# Patient Record
Sex: MALE | Race: WHITE | NOT HISPANIC OR LATINO | Employment: OTHER | URBAN - METROPOLITAN AREA
[De-identification: names, ages, dates, MRNs, and addresses within clinical notes are randomized per-mention and may not be internally consistent; named-entity substitution may affect disease eponyms.]

---

## 2017-03-22 ENCOUNTER — PREPPED CHART (OUTPATIENT)
Dept: URBAN - METROPOLITAN AREA CLINIC 43 | Facility: CLINIC | Age: 82
End: 2017-03-22

## 2018-03-21 ENCOUNTER — ESTABLISHED COMPREHENSIVE EXAM (OUTPATIENT)
Dept: URBAN - METROPOLITAN AREA CLINIC 43 | Facility: CLINIC | Age: 83
End: 2018-03-21

## 2018-03-21 DIAGNOSIS — H35.3131: ICD-10-CM

## 2018-03-21 DIAGNOSIS — H25.813: ICD-10-CM

## 2018-03-21 DIAGNOSIS — H43.813: ICD-10-CM

## 2018-03-21 DIAGNOSIS — H35.373: ICD-10-CM

## 2018-03-21 PROCEDURE — 92014 COMPRE OPH EXAM EST PT 1/>: CPT

## 2018-03-21 PROCEDURE — 92015 DETERMINE REFRACTIVE STATE: CPT

## 2018-03-21 PROCEDURE — 4177F TALK PT/CRGVR RE AREDS PREV: CPT

## 2018-03-21 PROCEDURE — 1036F TOBACCO NON-USER: CPT

## 2018-03-21 PROCEDURE — G8427 DOCREV CUR MEDS BY ELIG CLIN: HCPCS

## 2018-03-21 PROCEDURE — 2019F DILATED MACUL EXAM DONE: CPT

## 2018-03-21 PROCEDURE — G8785 BP SCRN NO PERF AT INTERVAL: HCPCS

## 2018-03-21 ASSESSMENT — VISUAL ACUITY
OD_SC: 20/200
OS_SC: J10
OD_BAT: 20/400
OD_CC: J1+
OS_SC: 20/200
OS_CC: J1+
OD_SC: J10
OS_BAT: 20/400
OD_CC: 20/50-1
OS_CC: 20/40-2

## 2018-03-21 ASSESSMENT — TONOMETRY
OS_IOP_MMHG: 13
OD_IOP_MMHG: 13

## 2019-04-10 ENCOUNTER — ESTABLISHED COMPREHENSIVE EXAM (OUTPATIENT)
Dept: URBAN - METROPOLITAN AREA CLINIC 43 | Facility: CLINIC | Age: 84
End: 2019-04-10

## 2019-04-10 DIAGNOSIS — H43.813: ICD-10-CM

## 2019-04-10 DIAGNOSIS — H35.3131: ICD-10-CM

## 2019-04-10 DIAGNOSIS — H25.813: ICD-10-CM

## 2019-04-10 DIAGNOSIS — H35.373: ICD-10-CM

## 2019-04-10 PROCEDURE — 92134 CPTRZ OPH DX IMG PST SGM RTA: CPT

## 2019-04-10 PROCEDURE — 92014 COMPRE OPH EXAM EST PT 1/>: CPT

## 2019-04-10 PROCEDURE — 92015 DETERMINE REFRACTIVE STATE: CPT

## 2019-04-10 ASSESSMENT — VISUAL ACUITY
OS_CC: J1
OS_SC: J12-
OD_CC: 20/40-2
OD_BAT: <20/400
OD_SC: J12
OD_SC: 20/400
OD_CC: J1
OD_PH: 20/50-1
OS_SC: 20/400
OS_BAT: <20/400
OS_PH: 20/60-1
OS_CC: 20/40-2

## 2019-04-10 ASSESSMENT — TONOMETRY
OS_IOP_MMHG: 11
OD_IOP_MMHG: 11

## 2019-05-14 ENCOUNTER — CATARACT CONSULT (OUTPATIENT)
Dept: URBAN - METROPOLITAN AREA CLINIC 43 | Facility: CLINIC | Age: 84
End: 2019-05-14

## 2019-05-14 DIAGNOSIS — H25.811: ICD-10-CM

## 2019-05-14 DIAGNOSIS — H25.812: ICD-10-CM

## 2019-05-14 DIAGNOSIS — H18.51: ICD-10-CM

## 2019-05-14 DIAGNOSIS — H35.373: ICD-10-CM

## 2019-05-14 DIAGNOSIS — H18.59: ICD-10-CM

## 2019-05-14 DIAGNOSIS — H35.3131: ICD-10-CM

## 2019-05-14 PROCEDURE — 99204 OFFICE O/P NEW MOD 45 MIN: CPT

## 2019-05-14 PROCEDURE — 92136TC INTERFEROMETRY - TECHNICAL COMPONENT

## 2019-05-14 PROCEDURE — 92286 ANT SGM IMG I&R SPECLR MIC: CPT

## 2019-05-14 PROCEDURE — 92025-2 CORNEAL TOPOGRAPHY, PT

## 2019-05-14 PROCEDURE — V2799I IMPRIMIS PREDGATIBROM

## 2019-05-14 PROCEDURE — 92134 CPTRZ OPH DX IMG PST SGM RTA: CPT

## 2019-05-14 ASSESSMENT — VISUAL ACUITY
OS_AM: 20/20
OD_CC: J1
OS_CC: J1
OS_SC: J12
OS_PH: 20/40+1
OD_RAM: 20/20
OS_BAT: <20/400
OD_SC: <J12
OD_SC: 20/200
OS_SC: 20/100
OD_CC: 20/50
OD_PH: 20/40
OD_BAT: <20/400
OS_CC: 20/40-2

## 2019-05-14 ASSESSMENT — TONOMETRY
OD_IOP_MMHG: 12
OS_IOP_MMHG: 12

## 2019-06-04 ENCOUNTER — TECH ONLY (OUTPATIENT)
Dept: URBAN - METROPOLITAN AREA CLINIC 43 | Facility: CLINIC | Age: 84
End: 2019-06-04

## 2019-06-04 DIAGNOSIS — H25.812: ICD-10-CM

## 2019-06-04 DIAGNOSIS — H25.811: ICD-10-CM

## 2019-06-04 PROCEDURE — 92025NC COMP. CORNEAL TOPO, UNI OR BILAT,

## 2019-06-04 PROCEDURE — 92136TCNC INTERFEROMETRY -TECHNICAL COMPONENT NO CHARGE

## 2019-06-10 ENCOUNTER — PRE-OP/H&P (OUTPATIENT)
Dept: URBAN - METROPOLITAN AREA CLINIC 39 | Facility: CLINIC | Age: 84
End: 2019-06-10

## 2019-06-10 ENCOUNTER — SURGERY/PROCEDURE (OUTPATIENT)
Dept: URBAN - METROPOLITAN AREA CLINIC 43 | Facility: CLINIC | Age: 84
End: 2019-06-10

## 2019-06-10 DIAGNOSIS — H35.3131: ICD-10-CM

## 2019-06-10 DIAGNOSIS — H25.812: ICD-10-CM

## 2019-06-10 DIAGNOSIS — H25.811: ICD-10-CM

## 2019-06-10 DIAGNOSIS — H18.59: ICD-10-CM

## 2019-06-10 DIAGNOSIS — H18.51: ICD-10-CM

## 2019-06-10 DIAGNOSIS — H35.373: ICD-10-CM

## 2019-06-10 PROCEDURE — 99211T TECH SERVICE

## 2019-06-10 PROCEDURE — 65772LRI LRI DURING CAT SX

## 2019-06-10 PROCEDURE — 66984AV REMOVE CATARACT, INSERT ADVANCED LENS

## 2019-06-10 PROCEDURE — 66999LNSR LENSAR LASER FOR CAT SX

## 2019-06-11 ENCOUNTER — POST OP/EVAL OF SECOND EYE (OUTPATIENT)
Dept: URBAN - METROPOLITAN AREA CLINIC 43 | Facility: CLINIC | Age: 84
End: 2019-06-11

## 2019-06-11 DIAGNOSIS — H25.812: ICD-10-CM

## 2019-06-11 DIAGNOSIS — Z96.1: ICD-10-CM

## 2019-06-11 PROCEDURE — 99024 POSTOP FOLLOW-UP VISIT: CPT

## 2019-06-11 PROCEDURE — 92012 INTRM OPH EXAM EST PATIENT: CPT

## 2019-06-11 ASSESSMENT — VISUAL ACUITY
OS_BAT: <20/400
OD_SC: 20/60-1
OS_SC: J12
OS_CC: 20/40-2
OS_PH: 20/40+1
OD_SC: J12
OS_SC: 20/100
OS_CC: J1

## 2019-06-11 ASSESSMENT — TONOMETRY
OS_IOP_MMHG: 12
OD_IOP_MMHG: 11

## 2019-06-17 ENCOUNTER — SURGERY/PROCEDURE (OUTPATIENT)
Dept: URBAN - METROPOLITAN AREA CLINIC 43 | Facility: CLINIC | Age: 84
End: 2019-06-17

## 2019-06-17 ENCOUNTER — PRE-OP/H&P (OUTPATIENT)
Dept: URBAN - METROPOLITAN AREA CLINIC 39 | Facility: CLINIC | Age: 84
End: 2019-06-17

## 2019-06-17 DIAGNOSIS — H25.812: ICD-10-CM

## 2019-06-17 PROCEDURE — 66999LNSR LENSAR LASER FOR CAT SX

## 2019-06-17 PROCEDURE — 65772LRI LRI DURING CAT SX

## 2019-06-17 PROCEDURE — 66984AV REMOVE CATARACT, INSERT ADVANCED LENS

## 2019-06-17 ASSESSMENT — VISUAL ACUITY
OS_SC: 20/100
OS_SC: <J12
OD_SC: 20/40+2

## 2019-06-17 ASSESSMENT — TONOMETRY
OD_IOP_MMHG: 12
OS_IOP_MMHG: 11

## 2019-06-18 ENCOUNTER — CATARACT POST-OP 1-DAY (OUTPATIENT)
Dept: URBAN - METROPOLITAN AREA CLINIC 43 | Facility: CLINIC | Age: 84
End: 2019-06-18

## 2019-06-18 DIAGNOSIS — Z96.1: ICD-10-CM

## 2019-06-18 PROCEDURE — 99024 POSTOP FOLLOW-UP VISIT: CPT

## 2019-06-18 ASSESSMENT — TONOMETRY
OS_IOP_MMHG: 06
OD_IOP_MMHG: 8.5

## 2019-06-18 ASSESSMENT — VISUAL ACUITY
OD_SC: J10
OD_SC: 20/30-2
OS_SC: 20/30-3
OS_SC: J6

## 2019-07-02 ENCOUNTER — POST-OP (OUTPATIENT)
Dept: URBAN - METROPOLITAN AREA CLINIC 43 | Facility: CLINIC | Age: 84
End: 2019-07-02

## 2019-07-02 DIAGNOSIS — Z96.1: ICD-10-CM

## 2019-07-02 PROCEDURE — 99024 POSTOP FOLLOW-UP VISIT: CPT

## 2019-07-02 ASSESSMENT — VISUAL ACUITY
OS_SC: 20/40-2
OS_SC: J1
OD_SC: 20/30-3
OU_SC: J1
OD_SC: J5

## 2019-07-02 ASSESSMENT — TONOMETRY
OS_IOP_MMHG: 08
OD_IOP_MMHG: 6.5

## 2019-11-11 ENCOUNTER — ESTABLISHED COMPREHENSIVE EXAM (OUTPATIENT)
Dept: URBAN - METROPOLITAN AREA CLINIC 43 | Facility: CLINIC | Age: 84
End: 2019-11-11

## 2019-11-11 DIAGNOSIS — H35.373: ICD-10-CM

## 2019-11-11 DIAGNOSIS — H18.51: ICD-10-CM

## 2019-11-11 DIAGNOSIS — H35.3131: ICD-10-CM

## 2019-11-11 DIAGNOSIS — H18.59: ICD-10-CM

## 2019-11-11 DIAGNOSIS — Z96.1: ICD-10-CM

## 2019-11-11 DIAGNOSIS — H43.813: ICD-10-CM

## 2019-11-11 DIAGNOSIS — H26.493: ICD-10-CM

## 2019-11-11 PROCEDURE — 92014 COMPRE OPH EXAM EST PT 1/>: CPT

## 2019-11-11 PROCEDURE — 92015 DETERMINE REFRACTIVE STATE: CPT

## 2019-11-11 ASSESSMENT — VISUAL ACUITY
OS_SC: J1
OD_SC: J10-
OD_BAT: 20/200 W/ MR
OD_SC: 20/50-1+1
OS_SC: 20/30-1

## 2019-11-11 ASSESSMENT — TONOMETRY
OD_IOP_MMHG: 8
OS_IOP_MMHG: 6

## 2020-06-08 ENCOUNTER — EST. PATIENT EMERGENCY (OUTPATIENT)
Dept: URBAN - METROPOLITAN AREA CLINIC 43 | Facility: CLINIC | Age: 85
End: 2020-06-08

## 2020-06-08 DIAGNOSIS — H35.3131: ICD-10-CM

## 2020-06-08 DIAGNOSIS — G43.101: ICD-10-CM

## 2020-06-08 PROCEDURE — 92012 INTRM OPH EXAM EST PATIENT: CPT

## 2020-06-08 ASSESSMENT — VISUAL ACUITY
OS_SC: 20/30-2
OD_SC: 20/70-1+1
OD_PH: 20/30-2

## 2020-06-08 ASSESSMENT — TONOMETRY
OS_IOP_MMHG: 7
OD_IOP_MMHG: 7

## 2020-07-13 NOTE — PATIENT DISCUSSION
Start Maxitrol TID OU x 1 week then D/C, then start ATs TID OU (Sample of Refresh Relivia given to patient) and Pataday QD OU.

## 2020-07-13 NOTE — PATIENT DISCUSSION
Recommend discontinuing contact lens wear until symptoms resolved, patient may consider switching to daily contact lenses. Will slowly restart contact lens wear after symptoms resolved.

## 2020-07-13 NOTE — PATIENT DISCUSSION
"Symptoms are consistent with contact lens over wear. Patient wears contact lenses 11+ hours per day. Patient denies sleeping in contacts. Patient states contact lens case is ""very old"". "

## 2020-07-27 NOTE — PATIENT DISCUSSION
Ok to return to CL daily once or twice a week, if tolerated well OK to increase wear time. If not tolerated well may consider steroid drops to reduce inflammation.

## 2020-08-13 NOTE — PATIENT DISCUSSION
Trial of daily contact lenses given to patient at last visit, patient wore lenses approximately 8 hours per day for 2 days per week and tolerated well. Recommend increasing wear time to 10-12 hours a day 3-4 times per week and then increase to daily wear if well tolerated. Advised patient to watch for redness, burning, and irritation. Recommend continuing Refresh for contact lens wear TID-QID and Pataday QD.

## 2020-11-16 ENCOUNTER — ESTABLISHED COMPREHENSIVE EXAM (OUTPATIENT)
Dept: URBAN - METROPOLITAN AREA CLINIC 43 | Facility: CLINIC | Age: 85
End: 2020-11-16

## 2020-11-16 DIAGNOSIS — H18.593: ICD-10-CM

## 2020-11-16 DIAGNOSIS — H35.3131: ICD-10-CM

## 2020-11-16 DIAGNOSIS — H26.493: ICD-10-CM

## 2020-11-16 DIAGNOSIS — Z96.1: ICD-10-CM

## 2020-11-16 DIAGNOSIS — H35.373: ICD-10-CM

## 2020-11-16 DIAGNOSIS — G43.101: ICD-10-CM

## 2020-11-16 PROCEDURE — 92014 COMPRE OPH EXAM EST PT 1/>: CPT

## 2020-11-16 PROCEDURE — 92015 DETERMINE REFRACTIVE STATE: CPT

## 2020-11-16 ASSESSMENT — TONOMETRY
OS_IOP_MMHG: 3
OD_IOP_MMHG: 4

## 2020-11-16 ASSESSMENT — VISUAL ACUITY
OS_BAT: <20/400
OS_SC: J2
OS_SC: 20/40-1
OD_BAT: <20/400
OD_SC: 20/60-1
OD_SC: J12

## 2020-11-24 ENCOUNTER — SURGERY/PROCEDURE (OUTPATIENT)
Dept: URBAN - METROPOLITAN AREA SURGERY 14 | Facility: SURGERY | Age: 85
End: 2020-11-24

## 2020-11-24 ENCOUNTER — YAG EVALUATION (OUTPATIENT)
Dept: URBAN - METROPOLITAN AREA CLINIC 39 | Facility: CLINIC | Age: 85
End: 2020-11-24

## 2020-11-24 DIAGNOSIS — H26.493: ICD-10-CM

## 2020-11-24 DIAGNOSIS — H18.513: ICD-10-CM

## 2020-11-24 DIAGNOSIS — H35.3131: ICD-10-CM

## 2020-11-24 DIAGNOSIS — H35.373: ICD-10-CM

## 2020-11-24 DIAGNOSIS — G43.101: ICD-10-CM

## 2020-11-24 DIAGNOSIS — H43.813: ICD-10-CM

## 2020-11-24 PROCEDURE — 6682150 YAG CAPSULOTOMY

## 2020-11-24 PROCEDURE — 92014 COMPRE OPH EXAM EST PT 1/>: CPT

## 2020-11-24 ASSESSMENT — TONOMETRY
OS_IOP_MMHG: 11
OD_IOP_MMHG: 10

## 2020-11-24 ASSESSMENT — VISUAL ACUITY
OD_SC: 20/50-1
OD_BAT: 20/<400
OS_SC: 20/30-1
OS_BAT: 20/<400

## 2020-12-02 ENCOUNTER — YAG POST-OP (OUTPATIENT)
Dept: URBAN - METROPOLITAN AREA CLINIC 43 | Facility: CLINIC | Age: 85
End: 2020-12-02

## 2020-12-02 DIAGNOSIS — Z96.1: ICD-10-CM

## 2020-12-02 PROCEDURE — 99024 POSTOP FOLLOW-UP VISIT: CPT

## 2020-12-02 ASSESSMENT — TONOMETRY
OS_IOP_MMHG: 04
OD_IOP_MMHG: 08

## 2020-12-02 ASSESSMENT — VISUAL ACUITY
OS_SC: 20/40+2
OD_SC: J12
OD_SC: 20/40-2
OS_SC: J1

## 2021-12-06 ENCOUNTER — COMPREHENSIVE EXAM (OUTPATIENT)
Dept: URBAN - METROPOLITAN AREA CLINIC 43 | Facility: CLINIC | Age: 86
End: 2021-12-06

## 2021-12-06 DIAGNOSIS — H50.53: ICD-10-CM

## 2021-12-06 DIAGNOSIS — Z96.1: ICD-10-CM

## 2021-12-06 DIAGNOSIS — H18.593: ICD-10-CM

## 2021-12-06 DIAGNOSIS — H35.3131: ICD-10-CM

## 2021-12-06 DIAGNOSIS — H49.12: ICD-10-CM

## 2021-12-06 PROCEDURE — 92014 COMPRE OPH EXAM EST PT 1/>: CPT

## 2021-12-06 PROCEDURE — 92015 DETERMINE REFRACTIVE STATE: CPT

## 2021-12-06 PROCEDURE — 92134 CPTRZ OPH DX IMG PST SGM RTA: CPT

## 2021-12-06 ASSESSMENT — VISUAL ACUITY
OD_SC: J12
OD_CC: J6
OS_SC: J2
OS_SC: 20/40+1
OS_CC: J1
OD_SC: 20/60-1

## 2021-12-06 ASSESSMENT — TONOMETRY
OS_IOP_MMHG: 7
OD_IOP_MMHG: 7

## 2022-12-06 ENCOUNTER — COMPREHENSIVE EXAM (OUTPATIENT)
Dept: URBAN - METROPOLITAN AREA CLINIC 43 | Facility: CLINIC | Age: 87
End: 2022-12-06

## 2022-12-06 PROCEDURE — 92014 COMPRE OPH EXAM EST PT 1/>: CPT

## 2022-12-06 PROCEDURE — 92015 DETERMINE REFRACTIVE STATE: CPT

## 2022-12-06 ASSESSMENT — TONOMETRY
OD_IOP_MMHG: 5
OS_IOP_MMHG: 7

## 2022-12-06 ASSESSMENT — VISUAL ACUITY
OD_SC: J12
OS_CC: J1
OS_SC: 20/40+2
OS_SC: J3
OD_SC: 20/60+2
OD_CC: J6

## 2024-01-11 ENCOUNTER — COMPREHENSIVE EXAM (OUTPATIENT)
Dept: URBAN - METROPOLITAN AREA CLINIC 43 | Facility: CLINIC | Age: 89
End: 2024-01-11

## 2024-01-11 DIAGNOSIS — H43.813: ICD-10-CM

## 2024-01-11 DIAGNOSIS — H49.12: ICD-10-CM

## 2024-01-11 DIAGNOSIS — H50.53: ICD-10-CM

## 2024-01-11 DIAGNOSIS — H18.593: ICD-10-CM

## 2024-01-11 DIAGNOSIS — H35.3131: ICD-10-CM

## 2024-01-11 DIAGNOSIS — G43.101: ICD-10-CM

## 2024-01-11 DIAGNOSIS — Z96.1: ICD-10-CM

## 2024-01-11 DIAGNOSIS — H35.373: ICD-10-CM

## 2024-01-11 PROCEDURE — 92014 COMPRE OPH EXAM EST PT 1/>: CPT

## 2024-01-11 PROCEDURE — 92015 DETERMINE REFRACTIVE STATE: CPT

## 2024-01-11 ASSESSMENT — TONOMETRY
OS_IOP_MMHG: 07
OD_IOP_MMHG: 08

## 2024-01-11 ASSESSMENT — VISUAL ACUITY
OU_CC: J1
OS_SC: 20/40
OS_CC: J2
OD_CC: J3
OD_SC: J12
OD_SC: 20/60+1
OS_SC: J2

## 2025-01-09 ENCOUNTER — COMPREHENSIVE EXAM (OUTPATIENT)
Age: 89
End: 2025-01-09

## 2025-01-09 DIAGNOSIS — H35.373: ICD-10-CM

## 2025-01-09 DIAGNOSIS — G43.101: ICD-10-CM

## 2025-01-09 DIAGNOSIS — Z96.1: ICD-10-CM

## 2025-01-09 DIAGNOSIS — H18.593: ICD-10-CM

## 2025-01-09 DIAGNOSIS — H43.813: ICD-10-CM

## 2025-01-09 DIAGNOSIS — H49.12: ICD-10-CM

## 2025-01-09 DIAGNOSIS — H35.3131: ICD-10-CM

## 2025-01-09 DIAGNOSIS — H50.53: ICD-10-CM

## 2025-01-09 PROCEDURE — 92014 COMPRE OPH EXAM EST PT 1/>: CPT

## 2025-01-09 PROCEDURE — 92015 DETERMINE REFRACTIVE STATE: CPT
